# Patient Record
Sex: FEMALE | Race: ASIAN | Employment: OTHER | URBAN - METROPOLITAN AREA
[De-identification: names, ages, dates, MRNs, and addresses within clinical notes are randomized per-mention and may not be internally consistent; named-entity substitution may affect disease eponyms.]

---

## 2019-02-05 PROBLEM — T43.91XA: Status: ACTIVE | Noted: 2019-02-05

## 2020-03-30 ENCOUNTER — OFFICE VISIT (OUTPATIENT)
Dept: ONCOLOGY | Age: 45
End: 2020-03-30

## 2020-03-30 DIAGNOSIS — Z12.31 SCREENING MAMMOGRAM, ENCOUNTER FOR: Primary | ICD-10-CM

## 2020-04-06 ENCOUNTER — OFFICE VISIT (OUTPATIENT)
Dept: ONCOLOGY | Age: 45
End: 2020-04-06

## 2020-04-06 ENCOUNTER — HOSPITAL ENCOUNTER (OUTPATIENT)
Dept: INFUSION THERAPY | Age: 45
Discharge: HOME | End: 2020-04-06
Payer: MEDICARE

## 2020-04-06 VITALS
OXYGEN SATURATION: 95 % | TEMPERATURE: 97.9 F | RESPIRATION RATE: 18 BRPM | HEART RATE: 98 BPM | DIASTOLIC BLOOD PRESSURE: 66 MMHG | SYSTOLIC BLOOD PRESSURE: 102 MMHG

## 2020-04-06 VITALS
SYSTOLIC BLOOD PRESSURE: 102 MMHG | OXYGEN SATURATION: 95 % | TEMPERATURE: 97.9 F | HEART RATE: 98 BPM | RESPIRATION RATE: 18 BRPM | DIASTOLIC BLOOD PRESSURE: 66 MMHG

## 2020-04-06 DIAGNOSIS — D75.1 POLYCYTHEMIA: ICD-10-CM

## 2020-04-06 DIAGNOSIS — D75.1 POLYCYTHEMIA: Primary | ICD-10-CM

## 2020-04-06 LAB
ALBUMIN SERPL-MCNC: 3.1 G/DL (ref 3.4–5)
ALBUMIN/GLOB SERPL: 0.9 {RATIO} (ref 0.8–1.7)
ALP SERPL-CCNC: 70 U/L (ref 45–117)
ALT SERPL-CCNC: 17 U/L (ref 13–56)
ANION GAP SERPL CALC-SCNC: 5 MMOL/L (ref 3–18)
AST SERPL-CCNC: 7 U/L (ref 10–38)
BASOPHILS # BLD: 0 K/UL (ref 0–0.1)
BASOPHILS NFR BLD: 0 % (ref 0–2)
BILIRUB SERPL-MCNC: 0.2 MG/DL (ref 0.2–1)
BUN SERPL-MCNC: 7 MG/DL (ref 7–18)
BUN/CREAT SERPL: 12 (ref 12–20)
CALCIUM SERPL-MCNC: 8.2 MG/DL (ref 8.5–10.1)
CHLORIDE SERPL-SCNC: 105 MMOL/L (ref 100–111)
CO2 SERPL-SCNC: 25 MMOL/L (ref 21–32)
CREAT SERPL-MCNC: 0.6 MG/DL (ref 0.6–1.3)
DIFFERENTIAL METHOD BLD: ABNORMAL
EOSINOPHIL # BLD: 0.1 K/UL (ref 0–0.4)
EOSINOPHIL NFR BLD: 1 % (ref 0–5)
ERYTHROCYTE [DISTWIDTH] IN BLOOD BY AUTOMATED COUNT: 14.9 % (ref 11.6–14.5)
GLOBULIN SER CALC-MCNC: 3.5 G/DL (ref 2–4)
GLUCOSE SERPL-MCNC: 122 MG/DL (ref 74–99)
HCT VFR BLD AUTO: 48.8 % (ref 35–45)
HGB BLD-MCNC: 16.9 G/DL (ref 12–16)
LYMPHOCYTES # BLD: 3.4 K/UL (ref 0.9–3.6)
LYMPHOCYTES NFR BLD: 40 % (ref 21–52)
MCH RBC QN AUTO: 29.9 PG (ref 24–34)
MCHC RBC AUTO-ENTMCNC: 34.6 G/DL (ref 31–37)
MCV RBC AUTO: 86.4 FL (ref 74–97)
MONOCYTES # BLD: 0.7 K/UL (ref 0.05–1.2)
MONOCYTES NFR BLD: 8 % (ref 3–10)
NEUTS SEG # BLD: 4.2 K/UL (ref 1.8–8)
NEUTS SEG NFR BLD: 51 % (ref 40–73)
PLATELET # BLD AUTO: 277 K/UL (ref 135–420)
PMV BLD AUTO: 10.1 FL (ref 9.2–11.8)
POTASSIUM SERPL-SCNC: 4.1 MMOL/L (ref 3.5–5.5)
PROT SERPL-MCNC: 6.6 G/DL (ref 6.4–8.2)
RBC # BLD AUTO: 5.65 M/UL (ref 4.2–5.3)
SODIUM SERPL-SCNC: 135 MMOL/L (ref 136–145)
WBC # BLD AUTO: 8.4 K/UL (ref 4.6–13.2)

## 2020-04-06 PROCEDURE — 81219 CALR GENE COM VARIANTS: CPT

## 2020-04-06 PROCEDURE — 82375 ASSAY CARBOXYHB QUANT: CPT

## 2020-04-06 PROCEDURE — 81270 JAK2 GENE: CPT

## 2020-04-06 PROCEDURE — 36415 COLL VENOUS BLD VENIPUNCTURE: CPT

## 2020-04-06 PROCEDURE — 82668 ASSAY OF ERYTHROPOIETIN: CPT

## 2020-04-06 PROCEDURE — 85025 COMPLETE CBC W/AUTO DIFF WBC: CPT

## 2020-04-06 PROCEDURE — 80053 COMPREHEN METABOLIC PANEL: CPT

## 2020-04-06 RX ORDER — METFORMIN HYDROCHLORIDE 1000 MG/1
TABLET ORAL
COMMUNITY
Start: 2020-03-28

## 2020-04-06 RX ORDER — DIVALPROEX SODIUM 500 MG/1
1000 TABLET, DELAYED RELEASE ORAL EVERY 12 HOURS
COMMUNITY
Start: 2019-02-04

## 2020-04-06 RX ORDER — RISPERIDONE 2 MG/1
2 TABLET, FILM COATED ORAL DAILY
COMMUNITY
Start: 2019-02-05

## 2020-04-06 RX ORDER — ATORVASTATIN CALCIUM 40 MG/1
TABLET, FILM COATED ORAL
COMMUNITY
Start: 2020-03-28

## 2020-04-06 RX ORDER — DOCOSANOL 100 MG/G
CREAM TOPICAL
COMMUNITY
Start: 2016-09-12

## 2020-04-06 RX ORDER — BLOOD-GLUCOSE METER
EACH MISCELLANEOUS
COMMUNITY
Start: 2019-02-05

## 2020-04-06 RX ORDER — PAROXETINE HYDROCHLORIDE 20 MG/1
20 TABLET, FILM COATED ORAL DAILY
COMMUNITY

## 2020-04-06 RX ORDER — HYDROXYZINE 25 MG/1
TABLET, FILM COATED ORAL 2 TIMES DAILY
COMMUNITY

## 2020-04-06 RX ORDER — INSULIN GLARGINE 100 [IU]/ML
15 INJECTION, SOLUTION SUBCUTANEOUS
COMMUNITY
Start: 2019-02-06

## 2020-04-06 RX ORDER — LISINOPRIL AND HYDROCHLOROTHIAZIDE 12.5; 2 MG/1; MG/1
TABLET ORAL
COMMUNITY
Start: 2020-03-28

## 2020-04-06 RX ORDER — ACYCLOVIR 200 MG/1
CAPSULE ORAL
COMMUNITY
Start: 2016-09-12

## 2020-04-06 RX ORDER — LANCETS
EACH MISCELLANEOUS
COMMUNITY
Start: 2019-02-05

## 2020-04-06 RX ORDER — LANOLIN ALCOHOL/MO/W.PET/CERES
9 CREAM (GRAM) TOPICAL
COMMUNITY
Start: 2019-02-04

## 2020-04-06 RX ORDER — HYDROQUINONE 40 MG/G
CREAM TOPICAL
COMMUNITY
Start: 2016-09-12

## 2020-04-06 NOTE — PROGRESS NOTES
Fortino Medina is a 45 y.o. female presenting today for a new patient appointment. Patient is ambulatory with no assistive devices and reports 10/10 pain in left arm \"I slept on it wrong\" and SOB x3 weeks. Chief Complaint   Patient presents with    New Patient     Elevated hemoglobin       Visit Vitals  /66   Pulse 98   Temp 97.9 °F (36.6 °C) (Oral)   Resp 18   LMP 03/19/2020 (Exact Date)   SpO2 95%       Current Outpatient Medications   Medication Sig    atorvastatin (LIPITOR) 40 mg tablet TAKE 1 TABLET BY MOUTH ONCE DAILY    divalproex DR (DEPAKOTE) 500 mg tablet Take 1,000 mg by mouth every twelve (12) hours.  lancets misc Lancet Type: Accu-Chek [employer] Drums (use with the Accu-Chek Ramona blood glucose meter). 1 box of 100 each. ICD 10 code: E 11.9 Type 2 Diabetes  Is patient on insulin: Yes Z279.4 Long Term (current) Insulin Use    lisinopril-hydroCHLOROthiazide (PRINZIDE, ZESTORETIC) 20-12.5 mg per tablet TAKE 1 TABLET BY MOUTH ONCE DAILY    metFORMIN (GLUCOPHAGE) 1,000 mg tablet TAKE 1 TABLET BY MOUTH TWICE DAILY WITH MEALS    risperiDONE (RisperDAL) 2 mg tablet Take 2 mg by mouth daily.  PARoxetine (PAXIL) 20 mg tablet Take 20 mg by mouth daily.  hydrOXYzine HCL (ATARAX) 25 mg tablet Take  by mouth two (2) times a day.  acyclovir (ZOVIRAX) 200 mg capsule 1 tablet    glucose blood VI test strips (ASCENSIA AUTODISC VI, ONE TOUCH ULTRA TEST VI) strip Please specify brand:One Touch Ultra Test Strips (use with One Touch Ultra 2 or One Touch UltraMini blood glucose meters).   ICD 10 code : E 11.9 Type 2 Diabetes  Is patient on insulin: Yes Z279.4 Long Term (current) Insulin Use    Blood-Glucose Meter misc Please specify brand: One Touch Ultra 2  ICD 10 code : E 11.9 Type 2 Diabetes  Is patient on insulin: Yes Z279.4 Long Term (current) Insulin Use    docosanol (Abreva) 10 % topical cream as directed    hydroquinone (ESOTERICA, MELQUIN) 4 % topical cream as directed to affected dark areas of skin    insulin glargine (LANTUS) 100 unit/mL injection 15 Units by SubCUTAneous route.  melatonin 3 mg tablet Take 9 mg by mouth nightly.  benztropine (COGENTIN) 1 mg tablet Take 1 Tab by mouth two (2) times daily as needed.  paliperidone (INVEGA) 3 mg SR tablet Take 3 Tabs by mouth daily. 1 tab qhs po    LORazepam (ATIVAN) 1 mg tablet Take 1 Tab by mouth every six (6) hours as needed for Anxiety. Max Daily Amount: 4 mg.  perphenazine (TRILAFON) 8 mg tablet Take 24 mg by mouth nightly.  paliperidone (INVEGA) 3 mg SR tablet Take 3 mg by mouth nightly.  perphenazine (TRILAFON) 8 mg tablet Take 16 mg by mouth daily. No current facility-administered medications for this visit. Fall Risk Assessment, last 12 mths 4/6/2020   Able to walk? Yes   Fall in past 12 months? Yes   Fall with injury? No   Number of falls in past 12 months 1   Fall Risk Score 1       3 most recent PHQ Screens 4/6/2020   PHQ Not Done Active Diagnosis of Depression or Bipolar Disorder       Abuse Screening Questionnaire 4/6/2020   Do you ever feel afraid of your partner? N   Are you in a relationship with someone who physically or mentally threatens you? N   Is it safe for you to go home?  Y       Learning Assessment 4/6/2020   PRIMARY LEARNER Patient   PRIMARY LANGUAGE ENGLISH   LEARNER PREFERENCE PRIMARY LISTENING   ANSWERED BY patient   RELATIONSHIP SELF

## 2020-04-06 NOTE — PROGRESS NOTES
[facility]  [id number] [place] [address], [address]  [facility]  Office Phone: [phone]  Fax: [phone]      Reason for visit: Polycythemia    HPI:   Katelynn Lam is a 45 y.o.  female cigarette smoking 1 pack/day for 29 years, obstructive sleep apnea diagnosed 10 years ago, but refused to use CPAP she says, who I was asked to see in consultation at the request of Dr. Ashutosh Mckinnon MD for evaluation for elevated hemoglobin. Labs on 2/6/2019 showed WBC 8.3, H&H 12.2/39.1, platelets 429. Labs on 3/6/2020 showed WBC 7.9, H&H 17.4/51.0, MCV 86, platelets 471. Patient was seen and examined today. She is awake alert oriented x3. On review of systems she denies any fevers, chills, nausea vomiting or abdominal pain. No weight loss. No drenching night sweats. No lumps and bumps. He has no history of blood clots or bleeding. Has no pruritus when taking a hot shower. No focal neurologic deficit. She has shortness of breath and left upper extremity pain. All other points of review of system have been reviewed and were negative. ECOG performance status 0. Independent with ADLs and IADLs. DX: Polycythemia      Past Medical History:   Diagnosis Date    Depression     Diabetes ([facility] [address]     Heartburn     Hypertension     Schizophrenia ([facility] [address]     Suicidal ideations      History reviewed. No pertinent surgical history.   Social History     Socioeconomic History    Marital status: SINGLE     Spouse name: Not on file    Number of children: Not on file    Years of education: Not on file    Highest education level: Not on file   Tobacco Use    Smoking status: Current Every Day Smoker     Packs/day: 1.00     Years: 29.00     Pack years: 29.00    Smokeless tobacco: Never Used   Substance and Sexual Activity    Alcohol use: No     Frequency: Never    Drug use: No    Sexual activity: Not Currently     Family History   Problem Relation Age of Onset   [facility] Cancer Mother     Diabetes Mother     Depression Mother     Stroke Mother     Diabetes Father     Depression Sister     Diabetes Paternal Grandmother        Current Outpatient Medications   Medication Sig Dispense Refill    atorvastatin (LIPITOR) 40 mg tablet TAKE 1 TABLET BY MOUTH ONCE DAILY      divalproex DR (DEPAKOTE) 500 mg tablet Take 1,000 mg by mouth every twelve (12) hours.  lancets misc Lancet Type: Accu-Chek [employer] Drums (use with the Accu-Chek Ramona blood glucose meter). 1 box of 100 each. ICD 10 code: E 11.9 Type 2 Diabetes  Is patient on insulin: Yes Z279.4 Long Term (current) Insulin Use      lisinopril-hydroCHLOROthiazide (PRINZIDE, ZESTORETIC) 20-12.5 mg per tablet TAKE 1 TABLET BY MOUTH ONCE DAILY      metFORMIN (GLUCOPHAGE) 1,000 mg tablet TAKE 1 TABLET BY MOUTH TWICE DAILY WITH MEALS      risperiDONE (RisperDAL) 2 mg tablet Take 2 mg by mouth daily.  PARoxetine (PAXIL) 20 mg tablet Take 20 mg by mouth daily.  hydrOXYzine HCL (ATARAX) 25 mg tablet Take  by mouth two (2) times a day.  acyclovir (ZOVIRAX) 200 mg capsule 1 tablet      glucose blood VI test strips (ASCENSIA AUTODISC VI, ONE TOUCH ULTRA TEST VI) strip Please specify brand:One Touch Ultra Test Strips (use with One Touch Ultra 2 or One Touch UltraMini blood glucose meters). ICD 10 code : E 11.9 Type 2 Diabetes  Is patient on insulin: Yes Z279.4 Long Term (current) Insulin Use      Blood-Glucose Meter misc Please specify brand: One Touch Ultra 2  ICD 10 code : E 11.9 Type 2 Diabetes  Is patient on insulin: Yes Z279.4 Long Term (current) Insulin Use      docosanol (Abreva) 10 % topical cream as directed      hydroquinone (ESOTERICA, MELQUIN) 4 % topical cream as directed to affected dark areas of skin      insulin glargine (LANTUS) 100 unit/mL injection 15 Units by SubCUTAneous route.       melatonin 3 mg tablet Take 9 mg by mouth nightly.  benztropine (COGENTIN) 1 mg tablet Take 1 Tab by mouth two (2) times daily as needed. 10 Tab 0    paliperidone (INVEGA) 3 mg SR tablet Take 3 Tabs by mouth daily. 1 tab qhs po 20 Tab 0    LORazepam (ATIVAN) 1 mg tablet Take 1 Tab by mouth every six (6) hours as needed for Anxiety. Max Daily Amount: 4 mg. 12 Tab 0    perphenazine (TRILAFON) 8 mg tablet Take 24 mg by mouth nightly.  paliperidone (INVEGA) 3 mg SR tablet Take 3 mg by mouth nightly.  perphenazine (TRILAFON) 8 mg tablet Take 16 mg by mouth daily. No Known Allergies    Review of Systems  As per HPI    Objective:  Physical Exam:  Visit Vitals  /66   Pulse 98   Temp 97.9 °F (36.6 °C) (Oral)   Resp 18   LMP 03/19/2020 (Exact Date)   SpO2 95%       General:  Alert, cooperative, no distress, appears stated age. Head:  Normocephalic, without obvious abnormality, atraumatic. Eyes:  Conjunctivae/corneas clear. PERRL, EOMs intact. Throat: Lips, mucosa, and tongue normal.    Neck: Supple, symmetrical, trachea midline, no adenopathy, thyroid: no enlargement/tenderness/nodules   Back:   Symmetric, no curvature. ROM normal. No CVA tenderness. Lungs:   Clear to auscultation bilaterally. Chest wall:  No tenderness or deformity. Heart:  Regular rate and rhythm, S1, S2 normal, no murmur, click, rub or gallop. Abdomen:   Soft, non-tender. Bowel sounds normal. No masses,  No organomegaly. Extremities: Extremities normal, atraumatic, no cyanosis or edema. Skin: Skin color, texture, turgor normal. No rashes or lesions. Lymph nodes: Cervical, supraclavicular, and axillary nodes normal.   Neurologic: CNII-XII intact.        Diagnostic Imaging     Results for orders placed during the hospital encounter of 05/07/11   CT HEAD WITHOUT CONTRAST    Narrative Ordering MD: Barron Vera MD  Signed By: Cleveland Weldon MD  ** FINAL **  ---------------------------------------------------------------------  Procedure Date:  May 10 2011  7:00PM    Accession Number:  [id number]  Order No: [id number]  Procedure: CTD - HEAD W/O IV CONTRAST  CPT Code: 42216  Reason: EVAL FOR ICH OR CVA  INTERPRETATION:  Cranial CT without contrast  HISTORY: Found unresponsive. Comparison CT 05/06/2011  FINDINGS: Ventricular system is normal and midline. There is no   evidence of mass, hemorrhage or extra-axial fluid. No evidence of   acute cortical infarct. There is fluid in the sphenoid sinus. Normal   calvarium. IMPRESSION:  No acute intracranial abnormality. Sphenoid sinusitis not present on   prior study. No other significant interval change. Lab Results  Lab Results   Component Value Date/Time    WBC 8.3 02/06/2019 04:29 AM    HGB 12.2 (L) 02/06/2019 04:29 AM    HCT 39.1 02/06/2019 04:29 AM    PLATELET 838 10/67/1469 04:29 AM    MCV 89.5 02/06/2019 04:29 AM       Lab Results   Component Value Date/Time    Sodium 139 02/06/2019 04:29 AM    Potassium 4.0 02/06/2019 04:29 AM    Chloride 106 02/06/2019 04:29 AM    CO2 25 02/06/2019 04:29 AM    Anion gap 9 02/06/2019 04:29 AM    Glucose 243 (H) 02/06/2019 04:29 AM    BUN 9 02/06/2019 04:29 AM    Creatinine 0.6 02/06/2019 04:29 AM    BUN/Creatinine ratio 18 01/22/2014 03:16 PM    GFR est AA >60.0 02/06/2019 04:29 AM    GFR est non-AA >60 02/06/2019 04:29 AM    Calcium 8.4 (L) 02/06/2019 04:29 AM    AST (SGOT) 17 02/05/2019 10:00 PM    Alk. phosphatase 73 02/05/2019 10:00 PM    Protein, total 6.8 02/05/2019 10:00 PM    Albumin 3.2 (L) 02/05/2019 10:00 PM    Globulin 3.7 01/22/2014 03:16 PM    A-G Ratio 1.1 01/22/2014 03:16 PM    ALT (SGPT) 22 02/05/2019 10:00 PM     Follow-up with PCP for health maintenance  Assessment/Plan:  45 y.o. female with     1. Polycythemia  I Had a long discussion with patient.   I told her that her polycythemia might be multifactorial most likely from poor lung function from her cigarette smoking and untreated obstructive sleep apnea. Plan is to check labs as below:  - CBC WITH AUTOMATED DIFF; Future  - METABOLIC PANEL, COMPREHENSIVE; Future  - CARBON MONOXIDE LEVEL, WHOLE BLOOD; Future  - JAK2 MUTATION ANALYSIS; Future  - CALR MUTATION ANALYSIS; Future  - ERYTHROPOIETIN; Future  - US ABD COMP; Future  - Refer to pulmonary medicine for obstructive sleep apnea. Patient needs CPAP    Thank you Dr. Abby Hopkins for referring patient to our care    Maureen Arreguin.     Return in 4 weeks

## 2020-04-06 NOTE — PROGRESS NOTES
[facility] Progress Note    Date: 2020    Name: Esme Porter    MRN: [id number]         : 1975    Peripheral Lab Draw      Ms. Eva Ruiz to [facility], ambulatory at  accompanied by self. Pt was assessed and education was provided. Ms. Lopez's vitals were reviewed and patient was observed for 5 minutes prior to treatment. Visit Vitals  /66   Pulse 98   Temp 97.9 °F (36.6 °C) (Oral)   Resp 18   SpO2 95%       Blood obtained peripherally from left arm x 1 attempt with butterfly needle and sent to lab for Cbc w/diff, Cnp, CALR and Jak2 Mutation, Erythropoietin and Carbon Monixide per written orders. No bleeding or hematoma noted at site. Gauze and coban applied. Ms. Eva Ruiz tolerated the phlebotomy, and had no complaints. Patient armband removed and shredded. Ms. Eva Ruiz was discharged from [place] [address] in stable condition at 1502.      Chelsea Hammond Phlebotomist PCT  2020  3:06 PM

## 2020-04-08 LAB — EPO SERPL-ACNC: 8.1 MIU/ML (ref 2.6–18.5)

## 2020-04-09 PROCEDURE — 76700 US EXAM ABDOM COMPLETE: CPT

## 2020-04-10 ENCOUNTER — HOSPITAL ENCOUNTER (OUTPATIENT)
Dept: ULTRASOUND IMAGING | Age: 45
Discharge: HOME | End: 2020-04-09
Attending: INTERNAL MEDICINE
Payer: MEDICARE

## 2020-04-10 DIAGNOSIS — D75.1 POLYCYTHEMIA: ICD-10-CM

## 2020-04-10 LAB
BACKGROUND, CALR2T: NORMAL
BACKGROUND: 489207: NORMAL
CALR MUTATION DETECTION RESULT, CALR1T: NORMAL
DIRECTOR REVIEW: 489204: NORMAL
JAK2 P.V617F BLD/T QL: NORMAL
LAB DIRECTOR NAME PROVIDER: NORMAL
REF LAB TEST METHOD: NORMAL
REFERENCES, CALR4T: NORMAL

## 2020-04-10 PROCEDURE — 76700 US EXAM ABDOM COMPLETE: CPT

## 2020-04-13 ENCOUNTER — TELEPHONE (OUTPATIENT)
Dept: ONCOLOGY | Age: 45
End: 2020-04-13

## 2020-04-17 ENCOUNTER — DOCUMENTATION ONLY (OUTPATIENT)
Dept: ONCOLOGY | Age: 45
End: 2020-04-17

## 2020-04-17 NOTE — PROGRESS NOTES
Patient called clinic, she wants to know the result of her abdominal ultrasound. I told patient that she has fatty liver and a gall stone per Dr. Pascual Crowley. no cold intolerance

## 2020-05-05 ENCOUNTER — TELEPHONE (OUTPATIENT)
Dept: ONCOLOGY | Age: 45
End: 2020-05-05

## 2020-05-05 NOTE — TELEPHONE ENCOUNTER
Left message on voicemail to contact our office reference appointment on 5/6/2020 @ 2pm transfer to virtual visit

## 2020-05-06 ENCOUNTER — VIRTUAL VISIT (OUTPATIENT)
Dept: ONCOLOGY | Age: 45
End: 2020-05-06

## 2020-05-06 DIAGNOSIS — D75.1 POLYCYTHEMIA: Primary | ICD-10-CM

## 2020-05-06 RX ORDER — HYDROXYZINE PAMOATE 25 MG/1
CAPSULE ORAL
COMMUNITY
Start: 2020-05-01

## 2020-05-06 NOTE — PROGRESS NOTES
Fabiano Hensley is a 45 y.o. female who was seen by synchronous (real-time) telephone technology on 5/6/2020. Consent: Fabiano Hensley, who was seen by synchronous (real-time) telephone technology, and/or her healthcare decision maker, is aware that this patient-initiated, Telehealth encounter on 5/6/2020 is a billable service, with coverage as determined by her insurance carrier. She is aware that she may receive a bill and has provided verbal consent to proceed: Yes. Assessment & Plan:   Diagnoses and all orders for this visit:    1. Polycythemia      The complexity of medical decision making for this visit is moderate       1. Polycythemia  I Had a long discussion with patient. I told her that her polycythemia might be multifactorial most likely from poor lung function from her cigarette smoking and untreated obstructive sleep apnea. Plan is to check labs as below:  On 4/6/2020, JAK2 mutation was negative as well as WINIFRED R.  BUN 7, creatinine 0.60. Serum EPO level was 8.1 (normal). WBC 8.4, H&H 16.9/48.8, platelet 368. Ultrasound of the abdomen showed hepatic steatosis but there was no liver or intra-abdominal masses. - Refer to pulmonary medicine for obstructive sleep apnea. She has history of obstructive sleep apnea and needs CPAP. Mrs. Aris Griffin is secondary to hypoxic state from cigarette smoking and obstructive sleep apnea. Work-up for myeloproliferative neoplasm is negative. I had a long discussion with her and strongly insisted on the importance of quitting smoking. She will also need to have CPAP to help with her sleep apnea. Both of these will be cardinal in helping her polycythemia. Unless symptomatic, it is not recommended to phlebotomize patient with secondary polycythemia. Eliminating the cause is the best course of action in this case cigarette smoking and CPAP as well as weight loss program.    No need for further hematology follow-up.   Patient to follow-up with PCP as scheduled.     Thank you Dr. Em Cooper for referring patient to our care     CC:Dr.Janell Em Cooper. I spent at least 23 minutes on this visit with this established patient. [phone]  Subjective:   HPI:   Fabiano Hensley is a 45 y.o.  female cigarette smoking 1 pack/day for 29 years, obstructive sleep apnea diagnosed 10 years ago, but refused to use CPAP she says, who I was asked to see in consultation at the request of Dr. Sherin Neff MD for evaluation for elevated hemoglobin. I saw her in consultation on 4/6/2020, and she is here for follow-up. Patient was seen and examined today. She is awake alert oriented x3. On review of systems she denies any fevers, chills, nausea vomiting or abdominal pain. No weight loss. No drenching night sweats. No lumps and bumps. He has no history of blood clots or bleeding. Has no pruritus when taking a hot shower. No focal neurologic deficit. All other points of review of system have been reviewed and were negative. ECOG performance status 0. Independent with ADLs and IADLs.    DX: Polycythemia    Prior to Admission medications    Medication Sig Start Date End Date Taking? Authorizing Provider   acyclovir (ZOVIRAX) 200 mg capsule 1 tablet 9/12/16   Provider, Historical   atorvastatin (LIPITOR) 40 mg tablet TAKE 1 TABLET BY MOUTH ONCE DAILY 3/28/20   Provider, Historical   glucose blood VI test strips (ASCENSIA AUTODISC VI, ONE TOUCH ULTRA TEST VI) strip Please specify brand:One Touch Ultra Test Strips (use with One Touch Ultra 2 or One Touch UltraMini blood glucose meters).   ICD 10 code : E 11.9 Type 2 Diabetes  Is patient on insulin: Yes Z279.4 Long Term (current) Insulin Use 2/5/19   Provider, Historical   Blood-Glucose Meter misc Please specify brand: One Touch Ultra 2  ICD 10 code : E 11.9 Type 2 Diabetes  Is patient on insulin: Yes Z279.4 Long Term (current) Insulin Use 2/5/19   Provider, Historical   divalproex DR (DEPAKOTE) 500 mg tablet Take 1,000 mg by mouth every twelve (12) hours. 2/4/19   Provider, Historical   docosanol (Abreva) 10 % topical cream as directed 9/12/16   Provider, Historical   hydroquinone (ESOTERICA, MELQUIN) 4 % topical cream as directed to affected dark areas of skin 9/12/16   Provider, Historical   insulin glargine (LANTUS) 100 unit/mL injection 15 Units by SubCUTAneous route. 2/6/19   Provider, Historical   lancets misc Lancet Type: Accu-Chek [employer] Drums (use with the Accu-Chek Ramona blood glucose meter). 1 box of 100 each. ICD 10 code: E 11.9 Type 2 Diabetes  Is patient on insulin: Yes Z279.4 Long Term (current) Insulin Use 2/5/19   Provider, Historical   lisinopril-hydroCHLOROthiazide (PRINZIDE, ZESTORETIC) 20-12.5 mg per tablet TAKE 1 TABLET BY MOUTH ONCE DAILY 3/28/20   Provider, Historical   melatonin 3 mg tablet Take 9 mg by mouth nightly. 2/4/19   Provider, Historical   metFORMIN (GLUCOPHAGE) 1,000 mg tablet TAKE 1 TABLET BY MOUTH TWICE DAILY WITH MEALS 3/28/20   Provider, Historical   risperiDONE (RisperDAL) 2 mg tablet Take 2 mg by mouth daily. 2/5/19   Provider, Historical   PARoxetine (PAXIL) 20 mg tablet Take 20 mg by mouth daily. Provider, Historical   hydrOXYzine HCL (ATARAX) 25 mg tablet Take  by mouth two (2) times a day. Provider, Historical   benztropine (COGENTIN) 1 mg tablet Take 1 Tab by mouth two (2) times daily as needed. 2/9/19   Cookie Martinez MD   paliperidone (INVEGA) 3 mg SR tablet Take 3 Tabs by mouth daily. 1 tab qhs po 2/9/19   Cookie Martinez MD   LORazepam (ATIVAN) 1 mg tablet Take 1 Tab by mouth every six (6) hours as needed for Anxiety. Max Daily Amount: 4 mg. 2/9/19   Cookie Martinez MD   perphenazine (TRILAFON) 8 mg tablet Take 24 mg by mouth nightly. Other, MD Aren   paliperidone (INVEGA) 3 mg SR tablet Take 3 mg by mouth nightly. Other, MD Aren   perphenazine (TRILAFON) 8 mg tablet Take 16 mg by mouth daily.     Other, MD Aren     No Known Allergies    Patient Active Problem List Diagnosis Code    Psychotropic overdose T43. 91XA    Polycythemia D75.1     Patient Active Problem List    Diagnosis Date Noted    Polycythemia 04/06/2020    Psychotropic overdose 02/05/2019     Current Outpatient Medications   Medication Sig Dispense Refill    acyclovir (ZOVIRAX) 200 mg capsule 1 tablet      atorvastatin (LIPITOR) 40 mg tablet TAKE 1 TABLET BY MOUTH ONCE DAILY      glucose blood VI test strips (ASCENSIA AUTODISC VI, ONE TOUCH ULTRA TEST VI) strip Please specify brand:One Touch Ultra Test Strips (use with One Touch Ultra 2 or One Touch UltraMini blood glucose meters). ICD 10 code : E 11.9 Type 2 Diabetes  Is patient on insulin: Yes Z279.4 Long Term (current) Insulin Use      Blood-Glucose Meter misc Please specify brand: One Touch Ultra 2  ICD 10 code : E 11.9 Type 2 Diabetes  Is patient on insulin: Yes Z279.4 Long Term (current) Insulin Use      divalproex DR (DEPAKOTE) 500 mg tablet Take 1,000 mg by mouth every twelve (12) hours.  docosanol (Abreva) 10 % topical cream as directed      hydroquinone (ESOTERICA, MELQUIN) 4 % topical cream as directed to affected dark areas of skin      insulin glargine (LANTUS) 100 unit/mL injection 15 Units by SubCUTAneous route.  lancets misc Lancet Type: Accu-Chek [employer] Drums (use with the Accu-Chek Ramona blood glucose meter). 1 box of 100 each. ICD 10 code: E 11.9 Type 2 Diabetes  Is patient on insulin: Yes Z279.4 Long Term (current) Insulin Use      lisinopril-hydroCHLOROthiazide (PRINZIDE, ZESTORETIC) 20-12.5 mg per tablet TAKE 1 TABLET BY MOUTH ONCE DAILY      melatonin 3 mg tablet Take 9 mg by mouth nightly.  metFORMIN (GLUCOPHAGE) 1,000 mg tablet TAKE 1 TABLET BY MOUTH TWICE DAILY WITH MEALS      risperiDONE (RisperDAL) 2 mg tablet Take 2 mg by mouth daily.  PARoxetine (PAXIL) 20 mg tablet Take 20 mg by mouth daily.  hydrOXYzine HCL (ATARAX) 25 mg tablet Take  by mouth two (2) times a day.       benztropine (COGENTIN) 1 mg tablet Take 1 Tab by mouth two (2) times daily as needed. 10 Tab 0    paliperidone (INVEGA) 3 mg SR tablet Take 3 Tabs by mouth daily. 1 tab qhs po 20 Tab 0    LORazepam (ATIVAN) 1 mg tablet Take 1 Tab by mouth every six (6) hours as needed for Anxiety. Max Daily Amount: 4 mg. 12 Tab 0    perphenazine (TRILAFON) 8 mg tablet Take 24 mg by mouth nightly.  paliperidone (INVEGA) 3 mg SR tablet Take 3 mg by mouth nightly.  perphenazine (TRILAFON) 8 mg tablet Take 16 mg by mouth daily. No Known Allergies  Past Medical History:   Diagnosis Date    Depression     [facility] ([facility] [address]     Heartburn     Hypertension     Schizophrenia ([facility] [address]     Suicidal ideations      No past surgical history on file. Family History   Problem Relation Age of Onset   [address] [facility] Cancer Mother     Diabetes Mother     Depression Mother     Stroke Mother     Diabetes Father     Depression Sister     Diabetes Paternal Grandmother      Social History     Tobacco Use    Smoking status: Current Every Day Smoker     Packs/day: 1.00     Years: 29.00     Pack years: 29.00    Smokeless tobacco: Never Used   Substance Use Topics    Alcohol use: No     Frequency: Never       ROS      Objective: There were no vitals taken for this visit. General: alert, cooperative, no distress   Mental  status: normal mood, behavior, speech, dress, motor activity, and thought processes, able to follow commands   HENT: NCAT   Neck: no visualized mass   Resp: no respiratory distress   Neuro: no gross deficits   Skin: no discoloration or lesions of concern on visible areas   Psychiatric: normal affect, consistent with stated mood, no evidence of hallucinations     Additional exam findings: We discussed the expected course, resolution and complications of the diagnosis(es) in detail. Medication risks, benefits, costs, interactions, and alternatives were discussed as indicated.   I advised her to contact the office if her condition worsens, changes or fails to improve as anticipated. She expressed understanding with the diagnosis(es) and plan. Brandi Amaro is a 45 y.o. female who was evaluated by a telephone visit encounter for concerns as above. Patient identification was verified prior to start of the visit. A caregiver was present when appropriate. Due to this being a TeleHealth encounter ([facility]), evaluation of the following organ systems was limited: Vitals/Constitutional/EENT/Resp/CV/GI//MS/Neuro/Skin/Heme-Lymph-Imm. Pursuant to the emergency declaration under the [facility][note 3][facility] waiver authority and the [employer] and [employer] Act, this Virtual  Visit was conducted, with patient's (and/or legal guardian's) consent, to reduce the patient's risk of exposure to COVID-19 and provide necessary medical care. Services were provided through a video synchronous discussion virtually to substitute for in-person clinic visit. Patient and provider were located at their individual homes.       Jeferson Hurley MD

## 2020-05-06 NOTE — PROGRESS NOTES
Talia Laguna is a 45 y.o. female presenting today for a follow-up appointment. Patient is at home; and verified by 2 patient identifiers with verbal permission to start virtual visit. Patient accompanied by self reports of right arm pain 8/10. Chief Complaint   Patient presents with    Polycythemia     follow up       NO vitals taken during this virtual visit; patient is at home. Depression Screening:  3 most recent PHQ Screens 5/6/2020   PHQ Not Done -   Little interest or pleasure in doing things Not at all   Feeling down, depressed, irritable, or hopeless Not at all   Total Score PHQ 2 0       Abuse Screening:  Abuse Screening Questionnaire 4/6/2020   Do you ever feel afraid of your partner? N   Are you in a relationship with someone who physically or mentally threatens you? N   Is it safe for you to go home? Y       Fall Risk  Fall Risk Assessment, last 12 mths 4/6/2020   Able to walk? Yes   Fall in past 12 months? Yes   Fall with injury? No   Number of falls in past 12 months 1   Fall Risk Score 1       Coordination of Care:  1. Have you been to the ER, urgent care clinic since your last visit? Hospitalized since your last visit?  no     2. Have you seen or consulted any other health care providers outside of the [address] [place] [address] since your last visit? Include any pap smears or colon screening. no    Additional instruction for patient to standby for connection with Dr. Casimiro Guy.